# Patient Record
Sex: FEMALE | Race: OTHER | HISPANIC OR LATINO | URBAN - METROPOLITAN AREA
[De-identification: names, ages, dates, MRNs, and addresses within clinical notes are randomized per-mention and may not be internally consistent; named-entity substitution may affect disease eponyms.]

---

## 2022-11-01 RX ORDER — INFLUENZA VIRUS VACCINE 15; 15; 15; 15 UG/.5ML; UG/.5ML; UG/.5ML; UG/.5ML
0.5 SUSPENSION INTRAMUSCULAR ONCE
Refills: 0 | Status: DISCONTINUED | OUTPATIENT
Start: 2022-11-02 | End: 2022-11-04

## 2022-11-01 NOTE — PATIENT PROFILE ADULT - FALL HARM RISK - UNIVERSAL INTERVENTIONS
Bed in lowest position, wheels locked, appropriate side rails in place/Call bell, personal items and telephone in reach/Instruct patient to call for assistance before getting out of bed or chair/Non-slip footwear when patient is out of bed/[place] to call system/Physically safe environment - no spills, clutter or unnecessary equipment/Purposeful Proactive Rounding/Room/bathroom lighting operational, light cord in reach

## 2022-11-01 NOTE — PATIENT PROFILE ADULT - LANGUAGE ASSISTANCE NEEDED
Yes-Patient/Caregiver accepts free interpretation services...  ID # [id number] - Jeannie @ [phone]/Yes-Patient/Caregiver accepts free interpretation services...

## 2022-11-02 ENCOUNTER — INPATIENT (INPATIENT)
Facility: HOSPITAL | Age: 42
LOS: 1 days | Discharge: HOME | DRG: 743 | End: 2022-11-04
Attending: OBSTETRICS & GYNECOLOGY | Admitting: OBSTETRICS & GYNECOLOGY
Payer: COMMERCIAL

## 2022-11-02 VITALS
HEART RATE: 59 BPM | HEIGHT: 55 IN | WEIGHT: 144.84 LBS | TEMPERATURE: 98 F | OXYGEN SATURATION: 99 % | RESPIRATION RATE: 18 BRPM | SYSTOLIC BLOOD PRESSURE: 115 MMHG | DIASTOLIC BLOOD PRESSURE: 75 MMHG

## 2022-11-02 DIAGNOSIS — Z98.51 TUBAL LIGATION STATUS: Chronic | ICD-10-CM

## 2022-11-02 LAB — GLUCOSE BLDC GLUCOMTR-MCNC: 97 MG/DL — SIGNIFICANT CHANGE UP (ref 70–99)

## 2022-11-02 PROCEDURE — 88307 TISSUE EXAM BY PATHOLOGIST: CPT | Mod: 26

## 2022-11-02 PROCEDURE — 88305 TISSUE EXAM BY PATHOLOGIST: CPT | Mod: 26

## 2022-11-02 PROCEDURE — 88302 TISSUE EXAM BY PATHOLOGIST: CPT | Mod: 26

## 2022-11-02 DEVICE — SURGICEL POWDER 3 GRAMS
Type: IMPLANTABLE DEVICE | Status: NON-FUNCTIONAL
Removed: 2022-11-02

## 2022-11-02 DEVICE — INTERCEED 3 X 4"
Type: IMPLANTABLE DEVICE | Status: NON-FUNCTIONAL
Removed: 2022-11-02

## 2022-11-02 DEVICE — SEPRAFILM 3 X 5"
Type: IMPLANTABLE DEVICE | Status: NON-FUNCTIONAL
Removed: 2022-11-02

## 2022-11-02 RX ORDER — SODIUM CHLORIDE 9 MG/ML
1000 INJECTION, SOLUTION INTRAVENOUS
Refills: 0 | Status: DISCONTINUED | OUTPATIENT
Start: 2022-11-02 | End: 2022-11-02

## 2022-11-02 RX ORDER — GABAPENTIN 400 MG/1
300 CAPSULE ORAL ONCE
Refills: 0 | Status: COMPLETED | OUTPATIENT
Start: 2022-11-02 | End: 2022-11-02

## 2022-11-02 RX ORDER — OXYCODONE HYDROCHLORIDE 5 MG/1
10 TABLET ORAL EVERY 4 HOURS
Refills: 0 | Status: DISCONTINUED | OUTPATIENT
Start: 2022-11-02 | End: 2022-11-04

## 2022-11-02 RX ORDER — METOCLOPRAMIDE HCL 10 MG
10 TABLET ORAL EVERY 6 HOURS
Refills: 0 | Status: DISCONTINUED | OUTPATIENT
Start: 2022-11-02 | End: 2022-11-04

## 2022-11-02 RX ORDER — HEPARIN SODIUM 5000 [USP'U]/ML
5000 INJECTION INTRAVENOUS; SUBCUTANEOUS ONCE
Refills: 0 | Status: COMPLETED | OUTPATIENT
Start: 2022-11-02 | End: 2022-11-02

## 2022-11-02 RX ORDER — ACETAMINOPHEN 500 MG
1000 TABLET ORAL ONCE
Refills: 0 | Status: COMPLETED | OUTPATIENT
Start: 2022-11-02 | End: 2022-11-02

## 2022-11-02 RX ORDER — HYDROMORPHONE HYDROCHLORIDE 2 MG/ML
0.5 INJECTION INTRAMUSCULAR; INTRAVENOUS; SUBCUTANEOUS
Refills: 0 | Status: DISCONTINUED | OUTPATIENT
Start: 2022-11-02 | End: 2022-11-03

## 2022-11-02 RX ORDER — SIMETHICONE 80 MG/1
80 TABLET, CHEWABLE ORAL EVERY 6 HOURS
Refills: 0 | Status: DISCONTINUED | OUTPATIENT
Start: 2022-11-02 | End: 2022-11-04

## 2022-11-02 RX ORDER — ACETAMINOPHEN 500 MG
1000 TABLET ORAL EVERY 6 HOURS
Refills: 0 | Status: COMPLETED | OUTPATIENT
Start: 2022-11-02 | End: 2022-11-03

## 2022-11-02 RX ORDER — PANTOPRAZOLE SODIUM 20 MG/1
20 TABLET, DELAYED RELEASE ORAL DAILY
Refills: 0 | Status: DISCONTINUED | OUTPATIENT
Start: 2022-11-02 | End: 2022-11-04

## 2022-11-02 RX ORDER — OXYCODONE HYDROCHLORIDE 5 MG/1
5 TABLET ORAL EVERY 4 HOURS
Refills: 0 | Status: DISCONTINUED | OUTPATIENT
Start: 2022-11-02 | End: 2022-11-04

## 2022-11-02 RX ORDER — BISOPROLOL FUMARATE 10 MG/1
1 TABLET, FILM COATED ORAL
Qty: 0 | Refills: 0 | DISCHARGE

## 2022-11-02 RX ORDER — ONDANSETRON 8 MG/1
8 TABLET, FILM COATED ORAL EVERY 6 HOURS
Refills: 0 | Status: DISCONTINUED | OUTPATIENT
Start: 2022-11-02 | End: 2022-11-04

## 2022-11-02 RX ORDER — CELECOXIB 200 MG/1
400 CAPSULE ORAL ONCE
Refills: 0 | Status: COMPLETED | OUTPATIENT
Start: 2022-11-02 | End: 2022-11-02

## 2022-11-02 RX ADMIN — HEPARIN SODIUM 5000 UNIT(S): 5000 INJECTION INTRAVENOUS; SUBCUTANEOUS at 12:09

## 2022-11-02 RX ADMIN — GABAPENTIN 300 MILLIGRAM(S): 400 CAPSULE ORAL at 12:09

## 2022-11-02 RX ADMIN — CELECOXIB 400 MILLIGRAM(S): 200 CAPSULE ORAL at 12:08

## 2022-11-02 RX ADMIN — Medication 1000 MILLIGRAM(S): at 12:09

## 2022-11-02 RX ADMIN — Medication 1000 MILLIGRAM(S): at 21:29

## 2022-11-02 RX ADMIN — PANTOPRAZOLE SODIUM 20 MILLIGRAM(S): 20 TABLET, DELAYED RELEASE ORAL at 21:30

## 2022-11-02 RX ADMIN — Medication 1000 MILLIGRAM(S): at 22:15

## 2022-11-02 NOTE — PRE-OP CHECKLIST - SELECT TESTS ORDERED
Cardiac Clearance, BS - 97, ucg - negative, TTE, MR Pelvis gyn/CBC/CMP/PT/PTT/INR/Type and Screen/EKG/CXR/POCT Blood Glucose/COVID-19

## 2022-11-02 NOTE — H&P ADULT - HISTORY OF PRESENT ILLNESS
Patient is a 42y y.o. , LMP 10/12, presents for scheduled GIGI/BS. Patient has a h/o HTN and multi-fibroid uterus. Denies chest pain, SOB, n/v, abdominal pain, vaginal bleeding.    Ob hx:  C/S for arrest of dilation.  scheduled repeat C/S.  scheduled repeat C/S. SAb x1.  Gyn hx: Fibroids, 11cm, 6cm, and 3cm. Multiple small ovarian cysts.  PMH: HTN  Meds: Bisoprolol fumarate 10 qD  PSH: C/S as above.  Allergies: Denies    Physical Exam  Gen: Well-appearing. NAD.  Resp: Breathing comfortably on RA.  Abd: Soft, non-tender, non-distended. 26w-size uterus with irregular contour.  Ext: No calf tenderness

## 2022-11-02 NOTE — CHART NOTE - NSCHARTNOTEFT_GEN_A_CORE
GYN POC    Pt seen and examined at bedside. Pt complains of mild abdominal pain, overall controlled with current pain regimen. Not yet OOB, passing flatus, or voiding, rodriguez in place draining light yellow urine   Pt denies any fever, chills, chest pain, SOB, nausea or vomiting     T(F): 98 (22 @ 21:58), Max: 98.3 (22 @ 12:47)  HR: 74 (22 @ 21:58) (55 - 74)  BP: 126/74 (22 @ 21:58) (113/66 - 138/64)  RR: 17 (22 @ 21:58) (10 - 18)  SpO2: 99% (22 @ 21:58) (99% - 100%)  Wt(kg): --     @ 07:01  -   @ 22:59  --------------------------------------------------------  IN: 670 mL / OUT: 255 mL / NET: 415 mL        acetaminophen     Tablet .. 1000 milliGRAM(s) Oral every 6 hours  HYDROmorphone  Injectable 0.5 milliGRAM(s) IV Push every 15 minutes PRN Severe Pain (7 - 10)  influenza   Vaccine 0.5 milliLiter(s) IntraMuscular once  metoclopramide Injectable 10 milliGRAM(s) IV Push every 6 hours PRN Nausea and/or Vomiting  ondansetron Injectable 8 milliGRAM(s) IV Push every 6 hours PRN Nausea and/or Vomiting  oxyCODONE    IR 5 milliGRAM(s) Oral every 4 hours PRN Moderate Pain (4 - 6)  oxyCODONE    IR 10 milliGRAM(s) Oral every 4 hours PRN Severe Pain (7 - 10)  pantoprazole  Injectable 20 milliGRAM(s) IV Push daily  simethicone 80 milliGRAM(s) Chew every 6 hours PRN Gas      Physical exam:  Constitutional: NAD  Pulmonary: clear to auscultation bilaterally  Cardiovascular: regular rate and rhythm  Abdomen: incision site clean, dry and intact w/ dermabond dressing in place. Soft, mildly tender, nondistended  Extremities: no lower extremity edema, or calf tenderness. SCDs in place    A/P: 42 y.o.  s/p abdominal CRISTINE/BS/LO for 26w-size fibroid uterus. S/p tap block.    Neuro: Tylenol ATC, oxycodone PRN severe pain. S/p tap block. **No toradol until AM  CV: VSS, HTN. Holding antihypertensives  Resp: RA. Encourage ISS  GI/FEN: Regular diet./hr, Zofran/Reglan PRN, Protonix,Simethicone PRN.  : Rodriguez  ID: Afebrile  DVT ppx: SCDs, ambulate as tolerated    - AM CBC  - Rodriguez out in AM

## 2022-11-02 NOTE — BRIEF OPERATIVE NOTE - OPERATION/FINDINGS
Vertical midline incision. Anterior uterine adhesions to rectus muscles and omentum lysed with ligasure. L oophorectomy performed after L ovary devascularized during ligation of uteroovarian ligament. Remainder of hysterectomy uncomplicated, completed with ligasure. Cervix left in situ and oversewn with suture. Excellent hemostasis. Surgicel powder left in place over cervix and vascular pedicles. Fascia closed with PDS

## 2022-11-02 NOTE — H&P ADULT - ASSESSMENT
42y GP with PMH HTN and multifibroid uterus for scheduled GIGI/BS, to be admitted for pain control and postoperative monitoring.     Plan   - NPO, IVF  - Starting Hgb 13.4, Hct 39.1  - Starting Cr 0.69  - Type and screen ordered      Joyce Miller MD PGY3

## 2022-11-02 NOTE — BRIEF OPERATIVE NOTE - NSICDXBRIEFPROCEDURE_GEN_ALL_CORE_FT
PROCEDURES:  Supracervical hysterectomy with salpingectomy 02-Nov-2022 18:00:39  Joyce Miller  Left oophorectomy 02-Nov-2022 18:01:12  Joyce Miller

## 2022-11-03 LAB
BASOPHILS # BLD AUTO: 0.01 K/UL — SIGNIFICANT CHANGE UP (ref 0–0.2)
BASOPHILS NFR BLD AUTO: 0.1 % — SIGNIFICANT CHANGE UP (ref 0–2)
EOSINOPHIL # BLD AUTO: 0 K/UL — SIGNIFICANT CHANGE UP (ref 0–0.5)
EOSINOPHIL NFR BLD AUTO: 0 % — SIGNIFICANT CHANGE UP (ref 0–6)
HCT VFR BLD CALC: 34.7 % — SIGNIFICANT CHANGE UP (ref 34.5–45)
HGB BLD-MCNC: 12.4 G/DL — SIGNIFICANT CHANGE UP (ref 11.5–15.5)
IMM GRANULOCYTES NFR BLD AUTO: 0.4 % — SIGNIFICANT CHANGE UP (ref 0–0.9)
LYMPHOCYTES # BLD AUTO: 0.69 K/UL — LOW (ref 1–3.3)
LYMPHOCYTES # BLD AUTO: 5.6 % — LOW (ref 13–44)
MCHC RBC-ENTMCNC: 30.4 PG — SIGNIFICANT CHANGE UP (ref 27–34)
MCHC RBC-ENTMCNC: 35.7 GM/DL — SIGNIFICANT CHANGE UP (ref 32–36)
MCV RBC AUTO: 85 FL — SIGNIFICANT CHANGE UP (ref 80–100)
MONOCYTES # BLD AUTO: 0.58 K/UL — SIGNIFICANT CHANGE UP (ref 0–0.9)
MONOCYTES NFR BLD AUTO: 4.7 % — SIGNIFICANT CHANGE UP (ref 2–14)
NEUTROPHILS # BLD AUTO: 11.09 K/UL — HIGH (ref 1.8–7.4)
NEUTROPHILS NFR BLD AUTO: 89.2 % — HIGH (ref 43–77)
NRBC # BLD: 0 /100 WBCS — SIGNIFICANT CHANGE UP (ref 0–0)
PLATELET # BLD AUTO: 203 K/UL — SIGNIFICANT CHANGE UP (ref 150–400)
RBC # BLD: 4.08 M/UL — SIGNIFICANT CHANGE UP (ref 3.8–5.2)
RBC # FLD: 12.4 % — SIGNIFICANT CHANGE UP (ref 10.3–14.5)
WBC # BLD: 12.42 K/UL — HIGH (ref 3.8–10.5)
WBC # FLD AUTO: 12.42 K/UL — HIGH (ref 3.8–10.5)

## 2022-11-03 RX ORDER — ACETAMINOPHEN 500 MG
2 TABLET ORAL
Qty: 0 | Refills: 0 | DISCHARGE
Start: 2022-11-03

## 2022-11-03 RX ORDER — ENOXAPARIN SODIUM 100 MG/ML
40 INJECTION SUBCUTANEOUS EVERY 24 HOURS
Refills: 0 | Status: DISCONTINUED | OUTPATIENT
Start: 2022-11-03 | End: 2022-11-04

## 2022-11-03 RX ORDER — KETOROLAC TROMETHAMINE 30 MG/ML
10 SYRINGE (ML) INJECTION EVERY 6 HOURS
Refills: 0 | Status: DISCONTINUED | OUTPATIENT
Start: 2022-11-03 | End: 2022-11-04

## 2022-11-03 RX ORDER — ACETAMINOPHEN 500 MG
975 TABLET ORAL EVERY 6 HOURS
Refills: 0 | Status: DISCONTINUED | OUTPATIENT
Start: 2022-11-03 | End: 2022-11-04

## 2022-11-03 RX ADMIN — OXYCODONE HYDROCHLORIDE 10 MILLIGRAM(S): 5 TABLET ORAL at 13:46

## 2022-11-03 RX ADMIN — ENOXAPARIN SODIUM 40 MILLIGRAM(S): 100 INJECTION SUBCUTANEOUS at 12:32

## 2022-11-03 RX ADMIN — Medication 1000 MILLIGRAM(S): at 09:22

## 2022-11-03 RX ADMIN — PANTOPRAZOLE SODIUM 20 MILLIGRAM(S): 20 TABLET, DELAYED RELEASE ORAL at 12:31

## 2022-11-03 RX ADMIN — Medication 1000 MILLIGRAM(S): at 03:49

## 2022-11-03 RX ADMIN — OXYCODONE HYDROCHLORIDE 10 MILLIGRAM(S): 5 TABLET ORAL at 12:46

## 2022-11-03 RX ADMIN — Medication 1000 MILLIGRAM(S): at 17:41

## 2022-11-03 RX ADMIN — Medication 1000 MILLIGRAM(S): at 10:54

## 2022-11-03 RX ADMIN — Medication 1000 MILLIGRAM(S): at 03:38

## 2022-11-03 RX ADMIN — Medication 1000 MILLIGRAM(S): at 20:20

## 2022-11-03 NOTE — DISCHARGE NOTE PROVIDER - NSDCMRMEDTOKEN_GEN_ALL_CORE_FT
acetaminophen 500 mg oral tablet: 2 tab(s) orally every 6 hours  bisoprolol 10 mg oral tablet: 1 tab(s) orally once a day

## 2022-11-03 NOTE — DISCHARGE NOTE PROVIDER - NSDCFUADDINST_GEN_ALL_CORE_FT
Pelvic rest (nothing in vagina) x6 weeks or unless otherwise instructed by physician. Schedule follow up appointment with Dr. Paulino  in 1-2 weeks. Go to nearest ED if fever >100.4 F, severe abdominal pain or heavy vaginal bleeding.   Wound care: Showering is allowed, no baths. Do not scrub incision area. Pat and dry all areas where incisions are.   Take Motrin 600mg every 6 hours or Tylenol 1000mg every 8 hours as needed for pain

## 2022-11-03 NOTE — PROGRESS NOTE ADULT - ATTENDING COMMENTS
patient seen and examined  +ambulation urination luzma po  no nv no fc no leg pain no chest pain  comfortable  vss as above  gen awake alert nad  ab +bs incision cdi, primary dressing removed, approp tender min distended  ex nt  ap  pod 1  ab supracervical hyst bs, left oophorectomy aubrie  doing well  encourage ambulation  lovenox at 3 pm\  awake return of bowel function

## 2022-11-03 NOTE — PROGRESS NOTE ADULT - ASSESSMENT
42 y.o.  s/p abdominal CRISTINE/BS/LO for 26w-size fibroid uterus. S/p tap block.POD1  Neuro: Tylenol ATC, oxycodone PRN severe pain. S/p tap block. **No toradol until AM  CV: VSS, HTN. Holding antihypertensives  Resp: RA. Encourage ISS  GI/FEN: Regular diet./hr, Zofran/Reglan PRN, Protonix,Simethicone PRN.  : Rodriguez in place  ID: Afebrile  DVT ppx: SCDs, ambulate as tolerated    O+ /  Will f/u AM CBC, remove rodriguez, f/u TOV, heplock and encourage ambulation today

## 2022-11-03 NOTE — DISCHARGE NOTE PROVIDER - HOSPITAL COURSE
43 yo s/p abdominal CRISTINE, BS, LO for 26 week size fibroid uterus.  Pt admitted post operatively for monitoring and pain control. Pt passing all post operative milestones- voiding, ambulating, tolerating regular diet.  Pt hemoglobin stable at 12.4 on POD1.  Pt is hemodynamically stable for discharge.  Pt to f/u with Dr. Paulino outpatient.

## 2022-11-03 NOTE — DISCHARGE NOTE PROVIDER - NSDCCPTREATMENT_GEN_ALL_CORE_FT
PRINCIPAL PROCEDURE  Procedure: Supracervical hysterectomy with salpingectomy  Findings and Treatment:

## 2022-11-03 NOTE — DISCHARGE NOTE PROVIDER - CARE PROVIDER_API CALL
Nan Paulino (MD)  Obstetrics and Gynecology  [address], [place] [address]  Phone: [phone]  Fax: [phone]  Follow Up Time: 2 weeks

## 2022-11-03 NOTE — DISCHARGE NOTE PROVIDER - NSDCCONDITION_GEN_ALL_CORE
PRBC infusing as per order. VS as noted. Pt. denies SOB, hives, chills, back pain. Respirations even & unlabored on room air. Side rails up, call bell within reach. Will continue to monitor. Stable

## 2022-11-04 VITALS
DIASTOLIC BLOOD PRESSURE: 74 MMHG | RESPIRATION RATE: 18 BRPM | TEMPERATURE: 98 F | HEART RATE: 72 BPM | SYSTOLIC BLOOD PRESSURE: 112 MMHG | OXYGEN SATURATION: 97 %

## 2022-11-04 LAB
BASOPHILS # BLD AUTO: 0.03 K/UL — SIGNIFICANT CHANGE UP (ref 0–0.2)
BASOPHILS NFR BLD AUTO: 0.3 % — SIGNIFICANT CHANGE UP (ref 0–2)
EOSINOPHIL # BLD AUTO: 0.02 K/UL — SIGNIFICANT CHANGE UP (ref 0–0.5)
EOSINOPHIL NFR BLD AUTO: 0.2 % — SIGNIFICANT CHANGE UP (ref 0–6)
HCT VFR BLD CALC: 31.8 % — LOW (ref 34.5–45)
HGB BLD-MCNC: 10.9 G/DL — LOW (ref 11.5–15.5)
IMM GRANULOCYTES NFR BLD AUTO: 0.3 % — SIGNIFICANT CHANGE UP (ref 0–0.9)
LYMPHOCYTES # BLD AUTO: 2.33 K/UL — SIGNIFICANT CHANGE UP (ref 1–3.3)
LYMPHOCYTES # BLD AUTO: 20.3 % — SIGNIFICANT CHANGE UP (ref 13–44)
MCHC RBC-ENTMCNC: 29.9 PG — SIGNIFICANT CHANGE UP (ref 27–34)
MCHC RBC-ENTMCNC: 34.3 GM/DL — SIGNIFICANT CHANGE UP (ref 32–36)
MCV RBC AUTO: 87.4 FL — SIGNIFICANT CHANGE UP (ref 80–100)
MONOCYTES # BLD AUTO: 0.6 K/UL — SIGNIFICANT CHANGE UP (ref 0–0.9)
MONOCYTES NFR BLD AUTO: 5.2 % — SIGNIFICANT CHANGE UP (ref 2–14)
NEUTROPHILS # BLD AUTO: 8.45 K/UL — HIGH (ref 1.8–7.4)
NEUTROPHILS NFR BLD AUTO: 73.7 % — SIGNIFICANT CHANGE UP (ref 43–77)
NRBC # BLD: 0 /100 WBCS — SIGNIFICANT CHANGE UP (ref 0–0)
PLATELET # BLD AUTO: 172 K/UL — SIGNIFICANT CHANGE UP (ref 150–400)
RBC # BLD: 3.64 M/UL — LOW (ref 3.8–5.2)
RBC # FLD: 13.1 % — SIGNIFICANT CHANGE UP (ref 10.3–14.5)
WBC # BLD: 11.46 K/UL — HIGH (ref 3.8–10.5)
WBC # FLD AUTO: 11.46 K/UL — HIGH (ref 3.8–10.5)

## 2022-11-04 PROCEDURE — 88302 TISSUE EXAM BY PATHOLOGIST: CPT

## 2022-11-04 PROCEDURE — 86901 BLOOD TYPING SEROLOGIC RH(D): CPT

## 2022-11-04 PROCEDURE — 86900 BLOOD TYPING SEROLOGIC ABO: CPT

## 2022-11-04 PROCEDURE — 86850 RBC ANTIBODY SCREEN: CPT

## 2022-11-04 PROCEDURE — 88305 TISSUE EXAM BY PATHOLOGIST: CPT

## 2022-11-04 PROCEDURE — 85025 COMPLETE CBC W/AUTO DIFF WBC: CPT

## 2022-11-04 PROCEDURE — 36415 COLL VENOUS BLD VENIPUNCTURE: CPT

## 2022-11-04 PROCEDURE — 82962 GLUCOSE BLOOD TEST: CPT

## 2022-11-04 PROCEDURE — C1765: CPT

## 2022-11-04 PROCEDURE — 88307 TISSUE EXAM BY PATHOLOGIST: CPT

## 2022-11-04 RX ADMIN — PANTOPRAZOLE SODIUM 20 MILLIGRAM(S): 20 TABLET, DELAYED RELEASE ORAL at 12:52

## 2022-11-04 RX ADMIN — Medication 975 MILLIGRAM(S): at 06:00

## 2022-11-04 RX ADMIN — Medication 975 MILLIGRAM(S): at 00:25

## 2022-11-04 RX ADMIN — ENOXAPARIN SODIUM 40 MILLIGRAM(S): 100 INJECTION SUBCUTANEOUS at 12:52

## 2022-11-04 RX ADMIN — Medication 975 MILLIGRAM(S): at 12:52

## 2022-11-04 RX ADMIN — Medication 975 MILLIGRAM(S): at 00:14

## 2022-11-04 RX ADMIN — Medication 975 MILLIGRAM(S): at 06:36

## 2022-11-04 NOTE — DISCHARGE NOTE NURSING/CASE MANAGEMENT/SOCIAL WORK - NSDCPEFALRISK_GEN_ALL_CORE
For information on Fall & Injury Prevention, visit: [email or web address] OR  [email or web address] OR  https://www.cdc.gov/steadi/patient.html

## 2022-11-04 NOTE — PROGRESS NOTE ADULT - ATTENDING COMMENTS
pt seen and examined  +flatus no nv luzma reg diet +ambulation and urination  comfortable  vss   Pe gen awake alert nad  ab + bs soft incsion cdi  ext nt  a/p 'pod 2  dc home

## 2022-11-04 NOTE — PROGRESS NOTE ADULT - ASSESSMENT
42 y.o.  s/p abdominal CRISTINE/BS/LO for 26w-size fibroid uterus. S/p tap block. POD2. Meeting all post-op milestones.  Neuro: Tylenol ATC,toradol q6 prn, oxycodone PRN severe pain. S/p tap block.  CV: VSS, HTN. Holding antihypertensives  Resp: RA. Encourage ISS  GI/FEN: Regular diet, heplock, Zofran/Reglan PRN, Protonix, Simethicone PRN.  : voiding  ID: Afebrile  DVT ppx: SCDs, ambulate as tolerated, lovenox    O+   Likely home today

## 2022-11-04 NOTE — PROGRESS NOTE ADULT - SUBJECTIVE AND OBJECTIVE BOX
Pt seen and examined at bedside. Pt states mild abdominal pain. Pt is not yet ambulating, tolerating clears, not yet passing flatus, adequate urine overnight  Pt denies fever, chills, chest pain, SOB, nausea, vomiting, lightheadedness, dizziness.      T(F): 98.9 (11-03-22 @ 05:31), Max: 98.9 (11-03-22 @ 05:31)  HR: 77 (11-03-22 @ 05:31) (55 - 77)  BP: 118/74 (11-03-22 @ 05:31) (113/66 - 138/64)  RR: 17 (11-03-22 @ 05:31) (10 - 18)  SpO2: 96% (11-03-22 @ 05:31) (95% - 100%)    I&O's Summary    02 Nov 2022 07:01  -  03 Nov 2022 06:52  --------------------------------------------------------  IN: 670 mL / OUT: 1055 mL / NET: -385 mL      MEDICATIONS  (STANDING):  acetaminophen     Tablet .. 1000 milliGRAM(s) Oral every 6 hours  influenza   Vaccine 0.5 milliLiter(s) IntraMuscular once  pantoprazole  Injectable 20 milliGRAM(s) IV Push daily    MEDICATIONS  (PRN):  HYDROmorphone  Injectable 0.5 milliGRAM(s) IV Push every 15 minutes PRN Severe Pain (7 - 10)  metoclopramide Injectable 10 milliGRAM(s) IV Push every 6 hours PRN Nausea and/or Vomiting  ondansetron Injectable 8 milliGRAM(s) IV Push every 6 hours PRN Nausea and/or Vomiting  oxyCODONE    IR 5 milliGRAM(s) Oral every 4 hours PRN Moderate Pain (4 - 6)  oxyCODONE    IR 10 milliGRAM(s) Oral every 4 hours PRN Severe Pain (7 - 10)  simethicone 80 milliGRAM(s) Chew every 6 hours PRN Gas      Physical Exam:  Constitutional: NAD  Pulmonary: no incr. WOB  Abdomen: incision site clean, dry, intact - pernio dressing in place. Soft, mildly tender, moderately distended, no guarding, no rebound, +bowel sounds  Extremities: no lower extremity edema or calf tenderness. SCDs in place     LABS:                        12.4   12.42 )-----------( 203      ( 03 Nov 2022 05:30 )             34.7                 RADIOLOGY & ADDITIONAL TESTS:    
Pt seen and examined at bedside. Pt states mild abdominal pain. Pt is ambulating, tolerating regulars, passing flatus, voiding.  Pt denies fever, chills, chest pain, SOB, nausea, vomiting, lightheadedness, dizziness.      Vital Signs Last 24 Hrs  T(C): 37 (04 Nov 2022 06:12), Max: 37.2 (03 Nov 2022 20:30)  T(F): 98.6 (04 Nov 2022 06:12), Max: 99 (03 Nov 2022 20:30)  HR: 67 (04 Nov 2022 06:12) (64 - 78)  BP: 100/58 (04 Nov 2022 06:12) (100/58 - 109/66)  RR: 18 (04 Nov 2022 06:12) (16 - 18)  SpO2: 94% (04 Nov 2022 06:12) (94% - 97%)    Parameters below as of 04 Nov 2022 06:12  Patient On (Oxygen Delivery Method): room air    MEDICATIONS  (STANDING):  acetaminophen     Tablet .. 1000 milliGRAM(s) Oral every 6 hours  influenza   Vaccine 0.5 milliLiter(s) IntraMuscular once  pantoprazole  Injectable 20 milliGRAM(s) IV Push daily    MEDICATIONS  (PRN):  HYDROmorphone  Injectable 0.5 milliGRAM(s) IV Push every 15 minutes PRN Severe Pain (7 - 10)  metoclopramide Injectable 10 milliGRAM(s) IV Push every 6 hours PRN Nausea and/or Vomiting  ondansetron Injectable 8 milliGRAM(s) IV Push every 6 hours PRN Nausea and/or Vomiting  oxyCODONE    IR 5 milliGRAM(s) Oral every 4 hours PRN Moderate Pain (4 - 6)  oxyCODONE    IR 10 milliGRAM(s) Oral every 4 hours PRN Severe Pain (7 - 10)  simethicone 80 milliGRAM(s) Chew every 6 hours PRN Gas      Physical Exam:  Constitutional: NAD  Pulmonary: no incr. WOB  Abdomen: incision site clean, dry, intact - pernio dressing in place. Soft, mildly tender, moderately distended, no guarding, no rebound, +bowel sounds  Extremities: no lower extremity edema or calf tenderness. SCDs in place     LABS:                          10.9   11.46 )-----------( 172      ( 04 Nov 2022 05:51 )             31.8                             12.4   12.42 )-----------( 203      ( 03 Nov 2022 05:30 )             34.7           RADIOLOGY & ADDITIONAL TESTS:

## 2022-11-04 NOTE — DISCHARGE NOTE NURSING/CASE MANAGEMENT/SOCIAL WORK - PATIENT PORTAL LINK FT
You can access the FollowMyHealth Patient Portal offered by [facility] by registering at the following website: [email or web address]. By joining [employer]’s FollowMyHealth portal, you will also be able to view your health information using other applications (apps) compatible with our system.

## 2022-11-10 DIAGNOSIS — D25.9 LEIOMYOMA OF UTERUS, UNSPECIFIED: ICD-10-CM

## 2022-11-10 DIAGNOSIS — K66.0 PERITONEAL ADHESIONS (POSTPROCEDURAL) (POSTINFECTION): ICD-10-CM

## 2022-11-10 DIAGNOSIS — I10 ESSENTIAL (PRIMARY) HYPERTENSION: ICD-10-CM

## (undated) DEVICE — RESERVOIR XTRA B BLD COLLECT

## (undated) DEVICE — GLV 7 PROTEXIS (WHITE)

## (undated) DEVICE — POSITIONER FOAM EGG CRATE ULNAR 2PCS (PINK)

## (undated) DEVICE — SUT VICRYL 0 36" CT-1 UNDYED

## (undated) DEVICE — FILTER REINFUSION FOR SALVAGED BLOOD DISP

## (undated) DEVICE — DRSG COMBINE 5X9"

## (undated) DEVICE — TUBING TUR 2 PRONG

## (undated) DEVICE — SUT MONOCRYL 4-0 27" PS-2 UNDYED

## (undated) DEVICE — SET BOWL XTRA 225

## (undated) DEVICE — FOLEY TRAY 16FR 5CC LF UMETER CLOSED

## (undated) DEVICE — LIGASURE IMPACT

## (undated) DEVICE — DRSG TAPE MICROPORE 3"

## (undated) DEVICE — Device

## (undated) DEVICE — VENODYNE/SCD SLEEVE CALF MEDIUM

## (undated) DEVICE — SUT VICRYL 0 27" CT-1 (POP-OFF)

## (undated) DEVICE — DRSG DERMABOND PRINEO 22CM

## (undated) DEVICE — TUBING BRAT 2 SUCTION ASSEMBLY TWIST LOCK

## (undated) DEVICE — PACK CYSTO

## (undated) DEVICE — WARMING BLANKET UPPER ADULT

## (undated) DEVICE — CATH ANGIOCATH 12G

## (undated) DEVICE — DRAINAGE BAG URINARY 4000CC

## (undated) DEVICE — STAPLER SKIN INSORB

## (undated) DEVICE — SUT VICRYL 2-0 27" CT-1

## (undated) DEVICE — PACK EXPLORATORY LAPAROTOMY

## (undated) DEVICE — SOL IRR BAG NS 0.9% 3000ML

## (undated) DEVICE — GLV 6.5 PROTEXIS (WHITE)

## (undated) DEVICE — PACK GENERAL CLOSING